# Patient Record
Sex: MALE | Race: WHITE | URBAN - METROPOLITAN AREA
[De-identification: names, ages, dates, MRNs, and addresses within clinical notes are randomized per-mention and may not be internally consistent; named-entity substitution may affect disease eponyms.]

---

## 2021-11-01 ENCOUNTER — OFFICE VISIT (OUTPATIENT)
Dept: URBAN - METROPOLITAN AREA CLINIC 121 | Facility: CLINIC | Age: 71
End: 2021-11-01

## 2021-11-16 ENCOUNTER — OFFICE VISIT (OUTPATIENT)
Dept: URBAN - METROPOLITAN AREA CLINIC 63 | Facility: CLINIC | Age: 71
End: 2021-11-16

## 2022-02-04 ENCOUNTER — OFFICE VISIT (OUTPATIENT)
Dept: URBAN - METROPOLITAN AREA SURGERY CENTER 4 | Facility: SURGERY CENTER | Age: 72
End: 2022-02-04

## 2022-02-09 LAB — PATHOLOGY (INDENTED REPORT): (no result)

## 2022-02-17 ENCOUNTER — OFFICE VISIT (OUTPATIENT)
Dept: URBAN - METROPOLITAN AREA CLINIC 63 | Facility: CLINIC | Age: 72
End: 2022-02-17

## 2022-02-21 ENCOUNTER — OFFICE VISIT (OUTPATIENT)
Dept: URBAN - METROPOLITAN AREA SURGERY CENTER 4 | Facility: SURGERY CENTER | Age: 72
End: 2022-02-21

## 2022-03-07 ENCOUNTER — OFFICE VISIT (OUTPATIENT)
Dept: URBAN - METROPOLITAN AREA CLINIC 63 | Facility: CLINIC | Age: 72
End: 2022-03-07

## 2022-05-03 ENCOUNTER — OFFICE VISIT (OUTPATIENT)
Dept: URBAN - METROPOLITAN AREA SURGERY CENTER 4 | Facility: SURGERY CENTER | Age: 72
End: 2022-05-03

## 2022-05-05 LAB — PATHOLOGY (INDENTED REPORT): (no result)

## 2022-05-17 ENCOUNTER — OFFICE VISIT (OUTPATIENT)
Dept: URBAN - METROPOLITAN AREA CLINIC 63 | Facility: CLINIC | Age: 72
End: 2022-05-17

## 2022-06-27 ENCOUNTER — OFFICE VISIT (OUTPATIENT)
Dept: URBAN - METROPOLITAN AREA CLINIC 63 | Facility: CLINIC | Age: 72
End: 2022-06-27

## 2022-06-27 ENCOUNTER — WEB ENCOUNTER (OUTPATIENT)
Dept: URBAN - METROPOLITAN AREA CLINIC 63 | Facility: CLINIC | Age: 72
End: 2022-06-27

## 2022-06-27 RX ORDER — DUTASTERIDE 0.5 MG/1
1 CAPSULE CAPSULE, LIQUID FILLED ORAL ONCE A DAY
Status: ACTIVE | COMMUNITY

## 2022-06-27 RX ORDER — OMEPRAZOLE 20 MG/1
1 CAPSULE CAPSULE, DELAYED RELEASE ORAL ONCE A DAY
Status: ACTIVE | COMMUNITY

## 2022-06-27 RX ORDER — DULOXETINE 60 MG/1
1 CAPSULE CAPSULE, DELAYED RELEASE PELLETS ORAL ONCE A DAY
Status: ACTIVE | COMMUNITY

## 2022-06-27 RX ORDER — METFORMIN HYDROCHLORIDE 1000 MG/1
1 TABLET TABLET, FILM COATED ORAL TWICE A DAY
Status: ACTIVE | COMMUNITY

## 2022-06-27 RX ORDER — APIXABAN 5 MG/1
TWICE A DAY TABLET, FILM COATED ORAL
Status: ACTIVE | COMMUNITY

## 2022-06-27 RX ORDER — TAMSULOSIN HYDROCHLORIDE 0.4 MG/1
1 CAPSULE CAPSULE ORAL TWICE A DAY
Status: ACTIVE | COMMUNITY

## 2022-07-07 ENCOUNTER — WEB ENCOUNTER (OUTPATIENT)
Dept: URBAN - METROPOLITAN AREA CLINIC 63 | Facility: CLINIC | Age: 72
End: 2022-07-07

## 2022-07-07 ENCOUNTER — OFFICE VISIT (OUTPATIENT)
Dept: URBAN - METROPOLITAN AREA CLINIC 63 | Facility: CLINIC | Age: 72
End: 2022-07-07
Payer: MEDICARE

## 2022-07-07 VITALS
HEIGHT: 72 IN | OXYGEN SATURATION: 99 % | BODY MASS INDEX: 40.93 KG/M2 | WEIGHT: 302.2 LBS | HEART RATE: 108 BPM | SYSTOLIC BLOOD PRESSURE: 140 MMHG | DIASTOLIC BLOOD PRESSURE: 77 MMHG

## 2022-07-07 DIAGNOSIS — K63.89 SMALL BOWEL MASS: ICD-10-CM

## 2022-07-07 PROCEDURE — 99213 OFFICE O/P EST LOW 20 MIN: CPT | Performed by: PHYSICIAN ASSISTANT

## 2022-07-07 RX ORDER — OMEPRAZOLE 20 MG/1
1 CAPSULE CAPSULE, DELAYED RELEASE ORAL ONCE A DAY
Status: ACTIVE | COMMUNITY

## 2022-07-07 RX ORDER — DUTASTERIDE 0.5 MG/1
1 CAPSULE CAPSULE, LIQUID FILLED ORAL ONCE A DAY
Status: ACTIVE | COMMUNITY

## 2022-07-07 RX ORDER — TAMSULOSIN HYDROCHLORIDE 0.4 MG/1
1 CAPSULE CAPSULE ORAL TWICE A DAY
Status: ACTIVE | COMMUNITY

## 2022-07-07 RX ORDER — METFORMIN HYDROCHLORIDE 1000 MG/1
1 TABLET TABLET, FILM COATED ORAL TWICE A DAY
Status: ACTIVE | COMMUNITY

## 2022-07-07 RX ORDER — DULOXETINE 60 MG/1
1 CAPSULE CAPSULE, DELAYED RELEASE PELLETS ORAL ONCE A DAY
Status: ACTIVE | COMMUNITY

## 2022-07-07 RX ORDER — APIXABAN 5 MG/1
TWICE A DAY TABLET, FILM COATED ORAL
Status: ACTIVE | COMMUNITY

## 2022-07-07 RX ORDER — ESZOPICLONE 2 MG/1
1 TABLET IMMEDIATELY BEFORE BEDTIME TABLET, FILM COATED ORAL ONCE A DAY
Status: ACTIVE | COMMUNITY

## 2022-07-07 NOTE — HPI-TODAY'S VISIT:
72-year-old male with history of Garcia's esophagus, atrial fibrillation on Eliquis presents to the office for follow-up.  He was initially referred to the office in November 2021 for evaluation of iron deficiency anemia.  His labs at that time demonstrated a hemoglobin of 9.4, serum iron 20.  He underwent EGD and colonoscopy on February 4, 2022.  His EGD demonstrated esophageal mucosal changes suspicious for short segment Garcia's esophagus.  His distal esophageal biopsy was negative for intestinal metaplasia.  Small hiatal hernia was observed.  He was found to have H. pylori negative gastritis.  Gastric biopsy demonstrated intestinal metaplasia.  The duodenum appeared normal with unremarkable biopsy.  His colonoscopy demonstrated a 5 mm polyp which was removed from the cecum.  The pathology showed polypoid fragments of benign colonic mucosa.  The terminal ileum contained a polypoid, partially obstructing large mass which measured 3 cm in length and 3 mm in diameter.  This prolapsed through the IC valve.  The biopsy showed polypoid fragment of benign superficial ileal mucosa with mild acute inflammation.  Additional findings included pandiverticulosis and grade 2 internal hemorrhoids.  He was sent for a CT abdomen/pelvis following colonoscopy which showed questionable thickening of the terminal ileum at the IC valve.  No evidence of any metastatic disease was noted in the abdomen or pelvis.  Repeat colonoscopy was done on May 3, 2022.  This demonstrated a large nonobstructing mass in the terminal ileum.  Initially this was not seen in the terminal ileum.  The mass could be seen about 10 cm into the terminal ileum.  This later prolapsed down to the level of the IC valve and into the cecum.  Possible submucosal, but the mucosa appeared inflamed and there were few superficial erosions.  There was no bleeding present.  The pathology showed polypoid benign ileal mucosa with inflammatory/regenerative changes and focal superficial erosion.  He was referred to general surgery. He saw Dr Samara waters  and ileocecetomy was discussed but the patient was hesitant. He presents to the office today doing well. He has decided to proceed with surgery. He has no GI complaints. He has no abdominal pain, nausea, vomiting, diarrhea, constipation, melena, hematochezia.   He has no family history of colon cancer.

## 2022-07-07 NOTE — PHYSICAL EXAM CHEST:
no lesions,  no deformities,  no traumatic injuries,   no masses present, breathing is unlabored without accessory muscle use, normal breath sounds

## 2022-07-09 ENCOUNTER — TELEPHONE ENCOUNTER (OUTPATIENT)
Dept: URBAN - METROPOLITAN AREA CLINIC 121 | Facility: CLINIC | Age: 72
End: 2022-07-09

## 2022-07-09 RX ORDER — DILTIAZEM HYDROCHLORIDE 240 MG/1
TABLET, EXTENDED RELEASE ORAL ONCE A DAY
Refills: 0 | OUTPATIENT
Start: 2022-03-07 | End: 2022-05-17

## 2022-07-09 RX ORDER — DULOXETINE 60 MG/1
CAPSULE, DELAYED RELEASE PELLETS ORAL
Refills: 0 | OUTPATIENT
Start: 2022-03-02 | End: 2022-05-17

## 2022-07-09 RX ORDER — PROPRANOLOL HYDROCHLORIDE 60 MG/1
CAPSULE, EXTENDED RELEASE ORAL
Refills: 0 | OUTPATIENT
Start: 2021-11-08 | End: 2021-11-16

## 2022-07-09 RX ORDER — PROPRANOLOL HYDROCHLORIDE 60 MG/1
CAPSULE, EXTENDED RELEASE ORAL
Refills: 0 | OUTPATIENT
Start: 2021-11-08 | End: 2021-11-15

## 2022-07-09 RX ORDER — OXYCODONE AND ACETAMINOPHEN 5; 325 MG/1; MG/1
TABLET ORAL
Refills: 0 | OUTPATIENT
Start: 2021-11-05 | End: 2021-11-15

## 2022-07-09 RX ORDER — DILTIAZEM HYDROCHLORIDE 240 MG/1
CAPSULE, EXTENDED RELEASE ORAL
Refills: 0 | OUTPATIENT
Start: 2022-05-24 | End: 2022-06-22

## 2022-07-09 RX ORDER — TAMSULOSIN HYDROCHLORIDE 0.4 MG/1
CAPSULE ORAL ONCE A DAY
Refills: 0 | OUTPATIENT
Start: 2022-05-17 | End: 2022-05-17

## 2022-07-09 RX ORDER — DILTIAZEM HYDROCHLORIDE 240 MG/1
CAPSULE, EXTENDED RELEASE ORAL
Refills: 0 | OUTPATIENT
Start: 2021-11-08 | End: 2021-11-16

## 2022-07-09 RX ORDER — DILTIAZEM HYDROCHLORIDE 240 MG/1
TABLET, EXTENDED RELEASE ORAL ONCE A DAY
Refills: 0 | OUTPATIENT
Start: 2021-11-16 | End: 2022-03-07

## 2022-07-09 RX ORDER — METHIMAZOLE 5 MG/1
TABLET ORAL
Refills: 0 | OUTPATIENT
Start: 2021-11-05 | End: 2021-11-16

## 2022-07-09 RX ORDER — DILTIAZEM HYDROCHLORIDE 240 MG/1
CAPSULE, EXTENDED RELEASE ORAL
Refills: 0 | OUTPATIENT
Start: 2021-11-08 | End: 2021-11-15

## 2022-07-09 RX ORDER — DUTASTERIDE 0.5 MG/1
CAPSULE, LIQUID FILLED ORAL
Refills: 0 | OUTPATIENT
Start: 2022-04-01 | End: 2022-05-17

## 2022-07-09 RX ORDER — DILTIAZEM HYDROCHLORIDE 240 MG/1
TABLET, EXTENDED RELEASE ORAL ONCE A DAY
Refills: 0 | OUTPATIENT
Start: 2022-05-17 | End: 2022-06-22

## 2022-07-09 RX ORDER — METHIMAZOLE 5 MG/1
TABLET ORAL
Refills: 0 | OUTPATIENT
Start: 2021-11-10 | End: 2021-11-15

## 2022-07-10 ENCOUNTER — TELEPHONE ENCOUNTER (OUTPATIENT)
Dept: URBAN - METROPOLITAN AREA CLINIC 121 | Facility: CLINIC | Age: 72
End: 2022-07-10

## 2022-07-10 RX ORDER — DILTIAZEM HYDROCHLORIDE 240 MG/1
CAPSULE, EXTENDED RELEASE ORAL
Refills: 0 | Status: ACTIVE | COMMUNITY
Start: 2022-05-23

## 2022-07-10 RX ORDER — PROPRANOLOL HYDROCHLORIDE 60 MG/1
CAPSULE, EXTENDED RELEASE ORAL ONCE A DAY
Refills: 0 | Status: ACTIVE | COMMUNITY
Start: 2021-11-16

## 2022-07-10 RX ORDER — OMEPRAZOLE 20 MG/1
TAKE ONE CAPSULE PO ONCE A DAY CAPSULE, DELAYED RELEASE ORAL ONCE A DAY
Refills: 3 | Status: ACTIVE | COMMUNITY
Start: 2021-11-16

## 2022-07-10 RX ORDER — DULOXETINE 60 MG/1
CAPSULE, DELAYED RELEASE PELLETS ORAL ONCE A DAY
Refills: 0 | Status: ACTIVE | COMMUNITY
Start: 2022-05-17

## 2022-07-10 RX ORDER — DILTIAZEM HYDROCHLORIDE 240 MG/1
CAPSULE, EXTENDED RELEASE ORAL ONCE A DAY
Refills: 0 | Status: ACTIVE | COMMUNITY
Start: 2021-11-16

## 2022-07-10 RX ORDER — DUTASTERIDE 0.5 MG/1
CAPSULE, LIQUID FILLED ORAL ONCE A DAY
Refills: 0 | Status: ACTIVE | COMMUNITY
Start: 2022-05-17

## 2022-07-10 RX ORDER — ESZOPICLONE 2 MG/1
TABLET, FILM COATED ORAL
Refills: 0 | Status: ACTIVE | COMMUNITY
Start: 2022-05-17

## 2022-07-10 RX ORDER — APIXABAN 5 MG/1
TABLET, FILM COATED ORAL TWICE A DAY
Refills: 0 | Status: ACTIVE | COMMUNITY
Start: 2022-05-17

## 2022-07-10 RX ORDER — METFORMIN HCL 1000 MG/1
TABLET ORAL TWICE A DAY
Refills: 0 | Status: ACTIVE | COMMUNITY
Start: 2022-05-17

## 2022-07-10 RX ORDER — METHIMAZOLE 5 MG/1
TABLET ORAL ONCE A DAY
Refills: 0 | Status: ACTIVE | COMMUNITY
Start: 2021-11-16

## 2022-07-10 RX ORDER — TAMSULOSIN HYDROCHLORIDE 0.4 MG/1
CAPSULE ORAL TWICE A DAY
Refills: 0 | Status: ACTIVE | COMMUNITY
Start: 2022-05-17

## 2022-12-14 ENCOUNTER — ERX REFILL RESPONSE (OUTPATIENT)
Dept: URBAN - METROPOLITAN AREA CLINIC 57 | Facility: CLINIC | Age: 72
End: 2022-12-14

## 2022-12-14 RX ORDER — OMEPRAZOLE 20 MG/1
1 CAPSULE CAPSULE, DELAYED RELEASE ORAL ONCE A DAY
OUTPATIENT

## 2022-12-14 RX ORDER — OMEPRAZOLE 20 MG/1
TAKE 1 CAPSULE EVERY DAY CAPSULE, DELAYED RELEASE ORAL
Qty: 90 CAPSULE | Refills: 0 | OUTPATIENT

## 2023-08-14 ENCOUNTER — OFFICE VISIT (OUTPATIENT)
Dept: URBAN - METROPOLITAN AREA CLINIC 63 | Facility: CLINIC | Age: 73
End: 2023-08-14
Payer: MEDICARE

## 2023-08-14 ENCOUNTER — LAB OUTSIDE AN ENCOUNTER (OUTPATIENT)
Dept: URBAN - METROPOLITAN AREA CLINIC 63 | Facility: CLINIC | Age: 73
End: 2023-08-14

## 2023-08-14 VITALS
SYSTOLIC BLOOD PRESSURE: 140 MMHG | BODY MASS INDEX: 36.98 KG/M2 | DIASTOLIC BLOOD PRESSURE: 70 MMHG | TEMPERATURE: 97.2 F | HEART RATE: 56 BPM | OXYGEN SATURATION: 96 % | HEIGHT: 72 IN | WEIGHT: 273 LBS

## 2023-08-14 DIAGNOSIS — K31.A0 GASTRIC INTESTINAL METAPLASIA: ICD-10-CM

## 2023-08-14 DIAGNOSIS — Z86.010 PERSONAL HISTORY OF COLONIC POLYPS: ICD-10-CM

## 2023-08-14 DIAGNOSIS — K22.70 BARRETT'S ESOPHAGUS WITHOUT DYSPLASIA: ICD-10-CM

## 2023-08-14 PROBLEM — 302914006: Status: ACTIVE | Noted: 2023-08-14

## 2023-08-14 PROBLEM — 428283002: Status: ACTIVE | Noted: 2023-08-14

## 2023-08-14 PROCEDURE — 99214 OFFICE O/P EST MOD 30 MIN: CPT | Performed by: PHYSICIAN ASSISTANT

## 2023-08-14 RX ORDER — METHIMAZOLE 5 MG/1
TABLET ORAL ONCE A DAY
Refills: 0 | Status: ACTIVE | COMMUNITY
Start: 2021-11-16

## 2023-08-14 RX ORDER — METFORMIN HYDROCHLORIDE 500 MG/1
1 TABLET WITH A MEAL TABLET, FILM COATED ORAL ONCE A DAY
Status: ACTIVE | COMMUNITY

## 2023-08-14 RX ORDER — ESZOPICLONE 2 MG/1
TABLET, FILM COATED ORAL
Refills: 0 | Status: ACTIVE | COMMUNITY
Start: 2022-05-17

## 2023-08-14 RX ORDER — DILTIAZEM HYDROCHLORIDE 240 MG/1
CAPSULE, EXTENDED RELEASE ORAL
Refills: 0 | Status: ACTIVE | COMMUNITY
Start: 2022-05-23

## 2023-08-14 RX ORDER — METFORMIN HCL 1000 MG/1
TABLET ORAL TWICE A DAY
Refills: 0 | Status: ACTIVE | COMMUNITY
Start: 2022-05-17

## 2023-08-14 RX ORDER — ZOLPIDEM TARTRATE 10 MG/1
1 TABLET AT BEDTIME AS NEEDED TABLET, FILM COATED ORAL ONCE A DAY
Status: ACTIVE | COMMUNITY

## 2023-08-14 RX ORDER — DUTASTERIDE 0.5 MG/1
1 CAPSULE CAPSULE, LIQUID FILLED ORAL ONCE A DAY
Status: ACTIVE | COMMUNITY

## 2023-08-14 RX ORDER — APIXABAN 5 MG/1
TWICE A DAY TABLET, FILM COATED ORAL
Status: ACTIVE | COMMUNITY

## 2023-08-14 RX ORDER — OMEPRAZOLE 20 MG/1
TAKE 1 CAPSULE EVERY DAY CAPSULE, DELAYED RELEASE ORAL
Qty: 90 CAPSULE | Refills: 0 | Status: ACTIVE | COMMUNITY

## 2023-08-14 RX ORDER — DULOXETINE 60 MG/1
CAPSULE, DELAYED RELEASE PELLETS ORAL ONCE A DAY
Refills: 0 | Status: ACTIVE | COMMUNITY
Start: 2022-05-17

## 2023-08-14 RX ORDER — TAMSULOSIN HYDROCHLORIDE 0.4 MG/1
1 CAPSULE CAPSULE ORAL TWICE A DAY
Status: ACTIVE | COMMUNITY

## 2023-08-14 NOTE — HPI-TODAY'S VISIT:
73-year-old male with atrial fibrillation on Eliquis, SSS with PPM, Garcia's esophagus presents to the office for annual follow-up. He was last seen in the office in July 2022.  He was initially referred for evaluation of iron deficiency anemia. EGD and colonoscopy were done February 4, 2022.  His EGD demonstrated esophageal mucosal changes suspicious for short segment Garcia's esophagus.  Distal esophageal biopsy was negative for intestinal metaplasia.  Small hiatal hernia.  H. pylori negative gastritis.  His gastric biopsy demonstrated intestinal metaplasia.  The duodenum was normal.  His colonoscopy demonstrated a 5 mm polyp which was removed from the cecum.  The pathology showed polypoid fragments of benign colonic mucosa.  The terminal ileum contained a polypoid, partially obstructing large mass which measures 3 cm in length and 3 mm in diameter.  This prolapsed through the IC valve.  The biopsy showed polypoid fragment of benign superficial ileal mucosa with mild acute inflammation.  Additional findings included pandiverticulosis and grade 2 internal hemorrhoids. CT scan of the abdomen and pelvis was done following colonoscopy which showed questionable thickening of the terminal ileum at the IC valve.  No evidence of any metastatic disease was noted in the abdomen or pelvis. Colonoscopy was repeated on May 3 2022 which demonstrated a large nonobstructing mass in the terminal ileum.  The mass could be seen about 10 cm into the terminal ileum.  This later prolapsed down to the level of the IC valve and into the cecum.  Possible submucosal, but the mucosa appeared inflamed and there were a few superficial erosions.  There was no bleeding present.  The pathology showed polypoid benign ileal mucosa with inflammatory/regenerative changes and focal superficial erosion. He was referred to Dr. Pascual and underwent hand-assisted laparoscopic ileocecectomy on August 31, 2022.  The pathology showed submucosal lipoma with exophytic intraluminal polypoid growth.  No malignancy, dysplasia, or adenoma. Postoperatively he developed a small bowel obstruction and then underwent exploratory laparotomy and resection of ileocolonic anastomosis with Dr. Pascual  on September 16, 2022.  He follows up today doing well. He is not having any GERD symptoms on omeprazole 20mg daily. He has no GI complaints today.

## 2023-10-10 ENCOUNTER — ERX REFILL RESPONSE (OUTPATIENT)
Dept: URBAN - METROPOLITAN AREA CLINIC 57 | Facility: CLINIC | Age: 73
End: 2023-10-10

## 2023-10-10 RX ORDER — OMEPRAZOLE 20 MG/1
TAKE 1 CAPSULE EVERY DAY CAPSULE, DELAYED RELEASE ORAL
Qty: 90 CAPSULE | Refills: 0 | OUTPATIENT

## 2024-02-29 ENCOUNTER — OV EP (OUTPATIENT)
Dept: URBAN - METROPOLITAN AREA CLINIC 63 | Facility: CLINIC | Age: 74
End: 2024-02-29

## 2024-03-28 ENCOUNTER — OV EP (OUTPATIENT)
Dept: URBAN - METROPOLITAN AREA CLINIC 63 | Facility: CLINIC | Age: 74
End: 2024-03-28

## 2024-03-28 VITALS
TEMPERATURE: 97.4 F | WEIGHT: 277.4 LBS | BODY MASS INDEX: 37.57 KG/M2 | DIASTOLIC BLOOD PRESSURE: 70 MMHG | HEIGHT: 72 IN | SYSTOLIC BLOOD PRESSURE: 120 MMHG | OXYGEN SATURATION: 95 % | HEART RATE: 76 BPM

## 2024-03-28 PROCEDURE — 992 APS NON BILLABLE: Performed by: PHYSICIAN ASSISTANT

## 2024-03-28 RX ORDER — TIRZEPATIDE 2.5 MG/.5ML
INJECT 2.5 MG EVERY WEEK BY SUBCUTANEOUS ROUTE INJECTION, SOLUTION SUBCUTANEOUS
Qty: 2 UNSPECIFIED | Refills: 0 | Status: ACTIVE | COMMUNITY

## 2024-03-28 RX ORDER — DILTIAZEM HYDROCHLORIDE 240 MG/1
CAPSULE, EXTENDED RELEASE ORAL
Refills: 0 | Status: ACTIVE | COMMUNITY
Start: 2022-05-23

## 2024-03-28 RX ORDER — ESZOPICLONE 2 MG/1
TABLET, FILM COATED ORAL
Refills: 0 | Status: ACTIVE | COMMUNITY
Start: 2022-05-17

## 2024-03-28 RX ORDER — METOPROLOL SUCCINATE 50 MG/1
TABLET, EXTENDED RELEASE ORAL
Qty: 180 TABLET | Status: ACTIVE | COMMUNITY

## 2024-03-28 RX ORDER — APIXABAN 5 MG/1
TWICE A DAY TABLET, FILM COATED ORAL
Status: ACTIVE | COMMUNITY

## 2024-03-28 RX ORDER — TAMSULOSIN HYDROCHLORIDE 0.4 MG/1
1 CAPSULE CAPSULE ORAL TWICE A DAY
Status: ACTIVE | COMMUNITY

## 2024-03-28 RX ORDER — METFORMIN HCL 1000 MG/1
TABLET ORAL TWICE A DAY
Refills: 0 | Status: ACTIVE | COMMUNITY
Start: 2022-05-17

## 2024-03-28 RX ORDER — ZOLPIDEM TARTRATE 10 MG/1
1 TABLET AT BEDTIME AS NEEDED TABLET, FILM COATED ORAL ONCE A DAY
Status: ACTIVE | COMMUNITY

## 2024-03-28 RX ORDER — OMEPRAZOLE 20 MG/1
TAKE 1 CAPSULE EVERY DAY CAPSULE, DELAYED RELEASE ORAL
Qty: 90 CAPSULE | Refills: 0 | Status: ACTIVE | COMMUNITY

## 2024-03-28 RX ORDER — DULOXETINE 60 MG/1
CAPSULE, DELAYED RELEASE PELLETS ORAL ONCE A DAY
Refills: 0 | Status: ACTIVE | COMMUNITY
Start: 2022-05-17

## 2024-03-28 RX ORDER — METHIMAZOLE 5 MG/1
TABLET ORAL ONCE A DAY
Refills: 0 | Status: ACTIVE | COMMUNITY
Start: 2021-11-16

## 2024-03-28 RX ORDER — DUTASTERIDE 0.5 MG/1
1 CAPSULE CAPSULE, LIQUID FILLED ORAL ONCE A DAY
Status: ACTIVE | COMMUNITY

## 2024-05-20 ENCOUNTER — ERX REFILL RESPONSE (OUTPATIENT)
Dept: URBAN - METROPOLITAN AREA CLINIC 57 | Facility: CLINIC | Age: 74
End: 2024-05-20

## 2024-05-20 RX ORDER — OMEPRAZOLE 20 MG/1
TAKE 1 CAPSULE EVERY DAY CAPSULE, DELAYED RELEASE ORAL
Qty: 90 CAPSULE | Refills: 3 | OUTPATIENT

## 2024-05-20 RX ORDER — OMEPRAZOLE 20 MG/1
TAKE 1 CAPSULE EVERY DAY CAPSULE, DELAYED RELEASE ORAL
Qty: 90 CAPSULE | Refills: 0 | OUTPATIENT

## 2025-03-24 ENCOUNTER — TELEPHONE ENCOUNTER (OUTPATIENT)
Dept: URBAN - METROPOLITAN AREA CLINIC 63 | Facility: CLINIC | Age: 75
End: 2025-03-24